# Patient Record
Sex: MALE
[De-identification: names, ages, dates, MRNs, and addresses within clinical notes are randomized per-mention and may not be internally consistent; named-entity substitution may affect disease eponyms.]

---

## 2023-09-21 ENCOUNTER — NURSE TRIAGE (OUTPATIENT)
Dept: OTHER | Facility: CLINIC | Age: 73
End: 2023-09-21

## 2023-09-22 NOTE — TELEPHONE ENCOUNTER
Location of patient: [place]    Subjective: Caller states \"I'm asking about my COVID vaccine. Is this the new vaccine for the new variants? \"     Triage RN recommended to pt to contact the facility where he received his vaccine and ask them about the vaccine.